# Patient Record
Sex: MALE | Race: WHITE | URBAN - METROPOLITAN AREA
[De-identification: names, ages, dates, MRNs, and addresses within clinical notes are randomized per-mention and may not be internally consistent; named-entity substitution may affect disease eponyms.]

---

## 2020-07-25 ENCOUNTER — TELEPHONE ENCOUNTER (OUTPATIENT)
Dept: URBAN - METROPOLITAN AREA CLINIC 13 | Facility: CLINIC | Age: 60
End: 2020-07-25

## 2020-07-25 RX ORDER — POLYETHYLENE GLYCOL 3350, SODIUM CHLORIDE, SODIUM BICARBONATE AND POTASSIUM CHLORIDE WITH LEMON FLAVOR 420; 11.2; 5.72; 1.48 G/4L; G/4L; G/4L; G/4L
USE AS DIRECTED POWDER, FOR SOLUTION ORAL
Qty: 1 | Refills: 0 | OUTPATIENT
Start: 2016-04-06 | End: 2016-05-11

## 2020-07-26 ENCOUNTER — TELEPHONE ENCOUNTER (OUTPATIENT)
Dept: URBAN - METROPOLITAN AREA CLINIC 13 | Facility: CLINIC | Age: 60
End: 2020-07-26

## 2020-07-26 RX ORDER — GLUCOSAMINE SULFATE 500 MG
TAKE AS DIRECTED CAPSULE ORAL
Refills: 0 | Status: ACTIVE | COMMUNITY

## 2020-07-26 RX ORDER — METFORMIN HYDROCHLORIDE 1000 MG/1
TAKE 1 TABLET TWICE DAILY WITH MEALS TABLET, COATED ORAL
Refills: 0 | Status: ACTIVE | COMMUNITY

## 2020-07-26 RX ORDER — IBUPROFEN 800 MG/1
TABLET ORAL
Qty: 90 | Refills: 0 | Status: ACTIVE | COMMUNITY
Start: 2016-01-26

## 2020-07-26 RX ORDER — DAPAGLIFLOZIN 5 MG/1
TAKE 1 TABLET DAILY TABLET, FILM COATED ORAL
Refills: 0 | Status: ACTIVE | COMMUNITY

## 2020-07-26 RX ORDER — OLMESARTAN MEDOXOMIL 40 MG/1
TAKE 1 TABLET DAILY TABLET, FILM COATED ORAL
Refills: 0 | Status: ACTIVE | COMMUNITY

## 2024-12-10 ENCOUNTER — TELEPHONE ENCOUNTER (OUTPATIENT)
Dept: URBAN - METROPOLITAN AREA CLINIC 113 | Facility: CLINIC | Age: 64
End: 2024-12-10

## 2024-12-10 VITALS — WEIGHT: 308 LBS | BODY MASS INDEX: 39.53 KG/M2 | HEIGHT: 74 IN

## 2024-12-10 RX ORDER — IBUPROFEN 800 MG/1
TABLET ORAL
Qty: 90 | Refills: 0 | Status: DISCONTINUED | COMMUNITY
Start: 2016-01-26

## 2024-12-10 RX ORDER — METFORMIN HYDROCHLORIDE 1000 MG/1
TAKE 1 TABLET TWICE DAILY WITH MEALS TABLET, COATED ORAL
Refills: 0 | Status: ACTIVE | COMMUNITY

## 2024-12-10 RX ORDER — TAMSULOSIN HYDROCHLORIDE 0.4 MG/1
2 CAPSULES CAPSULE ORAL ONCE A DAY
Status: ACTIVE | COMMUNITY

## 2024-12-10 RX ORDER — FENOFIBRATE 200 MG/1
1 CAPSULE WITH A MEAL CAPSULE ORAL ONCE A DAY
Status: DISCONTINUED | COMMUNITY

## 2024-12-10 RX ORDER — GABAPENTIN 300 MG/1
1 CAPSULE CAPSULE ORAL
Status: ACTIVE | COMMUNITY

## 2024-12-10 RX ORDER — EMPAGLIFLOZIN 25 MG/1
1 TABLET TABLET, FILM COATED ORAL ONCE A DAY
Status: ACTIVE | COMMUNITY

## 2024-12-10 RX ORDER — DAPAGLIFLOZIN 5 MG/1
TAKE 1 TABLET DAILY TABLET, FILM COATED ORAL
Refills: 0 | Status: DISCONTINUED | COMMUNITY

## 2024-12-10 RX ORDER — GLUCOSAMINE SULFATE 500 MG
TAKE AS DIRECTED CAPSULE ORAL
Refills: 0 | Status: DISCONTINUED | COMMUNITY

## 2024-12-10 RX ORDER — LOSARTAN POTASSIUM 100 MG/1
1 TABLET TABLET, FILM COATED ORAL ONCE A DAY
Status: ACTIVE | COMMUNITY

## 2024-12-10 RX ORDER — SEMAGLUTIDE 0.68 MG/ML
INJECT 2MG INJECTION, SOLUTION SUBCUTANEOUS WEEKLY
Status: ACTIVE | COMMUNITY

## 2024-12-10 RX ORDER — DICLOFENAC SODIUM 75 MG/1
1 TABLET AS NEEDED TABLET, DELAYED RELEASE ORAL TWICE A DAY
Status: ACTIVE | COMMUNITY

## 2024-12-10 RX ORDER — OLMESARTAN MEDOXOMIL 40 MG/1
TAKE 1 TABLET DAILY TABLET, FILM COATED ORAL
Refills: 0 | Status: DISCONTINUED | COMMUNITY

## 2024-12-12 ENCOUNTER — DASHBOARD ENCOUNTERS (OUTPATIENT)
Age: 64
End: 2024-12-12

## 2025-01-08 ENCOUNTER — OFFICE VISIT (OUTPATIENT)
Dept: URBAN - METROPOLITAN AREA CLINIC 113 | Facility: CLINIC | Age: 65
End: 2025-01-08
Payer: COMMERCIAL

## 2025-01-08 ENCOUNTER — LAB OUTSIDE AN ENCOUNTER (OUTPATIENT)
Dept: URBAN - METROPOLITAN AREA CLINIC 113 | Facility: CLINIC | Age: 65
End: 2025-01-08

## 2025-01-08 VITALS
WEIGHT: 315 LBS | BODY MASS INDEX: 40.43 KG/M2 | HEIGHT: 74 IN | DIASTOLIC BLOOD PRESSURE: 105 MMHG | HEART RATE: 73 BPM | SYSTOLIC BLOOD PRESSURE: 148 MMHG | RESPIRATION RATE: 18 BRPM | TEMPERATURE: 97.2 F

## 2025-01-08 DIAGNOSIS — Z12.11 COLON CANCER SCREENING: ICD-10-CM

## 2025-01-08 DIAGNOSIS — K59.09 OTHER CONSTIPATION: ICD-10-CM

## 2025-01-08 PROCEDURE — 99204 OFFICE O/P NEW MOD 45 MIN: CPT

## 2025-01-08 PROCEDURE — 99203 OFFICE O/P NEW LOW 30 MIN: CPT

## 2025-01-08 RX ORDER — SEMAGLUTIDE 0.68 MG/ML
INJECT 2MG INJECTION, SOLUTION SUBCUTANEOUS WEEKLY
Status: ACTIVE | COMMUNITY

## 2025-01-08 RX ORDER — AMOXICILLIN 500 MG/1
1 TABLET TABLET, FILM COATED ORAL THREE TIMES A DAY
Status: ACTIVE | COMMUNITY

## 2025-01-08 RX ORDER — EMPAGLIFLOZIN 25 MG/1
1 TABLET TABLET, FILM COATED ORAL ONCE A DAY
Status: ACTIVE | COMMUNITY

## 2025-01-08 RX ORDER — GABAPENTIN 300 MG/1
1 CAPSULE CAPSULE ORAL
Status: ACTIVE | COMMUNITY

## 2025-01-08 RX ORDER — LOSARTAN POTASSIUM 100 MG/1
1 TABLET TABLET, FILM COATED ORAL ONCE A DAY
Status: ACTIVE | COMMUNITY

## 2025-01-08 RX ORDER — DICLOFENAC SODIUM 75 MG/1
1 TABLET AS NEEDED TABLET, DELAYED RELEASE ORAL TWICE A DAY
Status: ACTIVE | COMMUNITY

## 2025-01-08 RX ORDER — METFORMIN HYDROCHLORIDE 1000 MG/1
TAKE 1 TABLET TWICE DAILY WITH MEALS TABLET, COATED ORAL
Refills: 0 | Status: ACTIVE | COMMUNITY

## 2025-01-08 RX ORDER — TAMSULOSIN HYDROCHLORIDE 0.4 MG/1
2 CAPSULES CAPSULE ORAL ONCE A DAY
Status: ACTIVE | COMMUNITY

## 2025-01-08 NOTE — HPI-TODAY'S VISIT:
64-year-old gentleman presenting to discuss scheduling her surveillance colonoscopy.  His last colonoscopy performed 5/12/2016 with BBPS of 7 showed 3 diminutive to small polyps. Pathology revealed a tubular adenoma, leiomyoma and prominent mucosal lymphoid aggregate.  Labs available for review from 12/11/2024 showed normal CBC, glucose under 54, normal LFTs, triglycerides 415, hemoglobin A1c 7.8%.  He reports he is on Ozempic, he started this a year ago. He noticed some change to his bowel movement, as he was previously very regular with a daily bowel movement. He now has bowel movement after 2 days. He is taking 2 TBSP benefiber daily. He noticed consistency of the bowel movement to a paste-like. He did see some black, stool he reports after starting Rosuvastatin, he stopped and the black stool resolved. He does take diclofenac daily. Denies nausea, vomiting, or fevers. Denies BRBPR. Patient denies any history of lung disease, need for supplemental oxygen. Patient denies any history of cardiac disease, stent placement, need for blood thinning medications, history of pacemaker or defibrillator. There is no history of chronic kidney disease or need for dialysis. No history of neurologic disease or seizure disorder.

## 2025-01-10 ENCOUNTER — TELEPHONE ENCOUNTER (OUTPATIENT)
Dept: URBAN - METROPOLITAN AREA CLINIC 113 | Facility: CLINIC | Age: 65
End: 2025-01-10

## 2025-01-13 ENCOUNTER — CLAIMS CREATED FROM THE CLAIM WINDOW (OUTPATIENT)
Dept: URBAN - METROPOLITAN AREA SURGERY CENTER 25 | Facility: SURGERY CENTER | Age: 65
End: 2025-01-13
Payer: COMMERCIAL

## 2025-01-13 ENCOUNTER — CLAIMS CREATED FROM THE CLAIM WINDOW (OUTPATIENT)
Dept: URBAN - METROPOLITAN AREA CLINIC 4 | Facility: CLINIC | Age: 65
End: 2025-01-13
Payer: COMMERCIAL

## 2025-01-13 DIAGNOSIS — K57.30 COLON, DIVERTICULOSIS: ICD-10-CM

## 2025-01-13 DIAGNOSIS — Z86.0101 HISTORY OF ADENOMATOUS POLYP OF COLON: ICD-10-CM

## 2025-01-13 DIAGNOSIS — K63.5 BENIGN COLON POLYPS: ICD-10-CM

## 2025-01-13 DIAGNOSIS — Z86.0101 H/O ADENOMATOUS POLYP OF COLON: ICD-10-CM

## 2025-01-13 DIAGNOSIS — Z86.0100 PERSONAL HISTORY OF COLON POLYPS, UNSPECIFIED: ICD-10-CM

## 2025-01-13 DIAGNOSIS — K63.89 OTHER SPECIFIED DISEASES OF INTESTINE: ICD-10-CM

## 2025-01-13 DIAGNOSIS — Z09 ENCNTR FOR F/U EXAM AFT TRTMT FOR COND OTH THAN MALIG NEOPLM: ICD-10-CM

## 2025-01-13 PROCEDURE — 88305 TISSUE EXAM BY PATHOLOGIST: CPT | Performed by: PATHOLOGY

## 2025-01-13 PROCEDURE — 45385 COLONOSCOPY W/LESION REMOVAL: CPT | Performed by: INTERNAL MEDICINE

## 2025-01-13 PROCEDURE — 00811 ANES LWR INTST NDSC NOS: CPT | Performed by: NURSE ANESTHETIST, CERTIFIED REGISTERED

## 2025-01-13 PROCEDURE — 0529F INTRVL 3/>YR PTS CLNSCP DOCD: CPT | Performed by: INTERNAL MEDICINE

## 2025-01-13 RX ORDER — DICLOFENAC SODIUM 75 MG/1
1 TABLET AS NEEDED TABLET, DELAYED RELEASE ORAL TWICE A DAY
Status: ACTIVE | COMMUNITY

## 2025-01-13 RX ORDER — AMOXICILLIN 500 MG/1
1 TABLET TABLET, FILM COATED ORAL THREE TIMES A DAY
Status: ACTIVE | COMMUNITY

## 2025-01-13 RX ORDER — GABAPENTIN 300 MG/1
1 CAPSULE CAPSULE ORAL
Status: ACTIVE | COMMUNITY

## 2025-01-13 RX ORDER — SEMAGLUTIDE 0.68 MG/ML
INJECT 2MG INJECTION, SOLUTION SUBCUTANEOUS WEEKLY
Status: ACTIVE | COMMUNITY

## 2025-01-13 RX ORDER — EMPAGLIFLOZIN 25 MG/1
1 TABLET TABLET, FILM COATED ORAL ONCE A DAY
Status: ACTIVE | COMMUNITY

## 2025-01-13 RX ORDER — TAMSULOSIN HYDROCHLORIDE 0.4 MG/1
2 CAPSULES CAPSULE ORAL ONCE A DAY
Status: ACTIVE | COMMUNITY

## 2025-01-13 RX ORDER — METFORMIN HYDROCHLORIDE 1000 MG/1
TAKE 1 TABLET TWICE DAILY WITH MEALS TABLET, COATED ORAL
Refills: 0 | Status: ACTIVE | COMMUNITY

## 2025-01-13 RX ORDER — LOSARTAN POTASSIUM 100 MG/1
1 TABLET TABLET, FILM COATED ORAL ONCE A DAY
Status: ACTIVE | COMMUNITY